# Patient Record
Sex: FEMALE | Race: ASIAN | URBAN - METROPOLITAN AREA
[De-identification: names, ages, dates, MRNs, and addresses within clinical notes are randomized per-mention and may not be internally consistent; named-entity substitution may affect disease eponyms.]

---

## 2023-12-14 ENCOUNTER — OFFICE VISIT (OUTPATIENT)
Dept: ALLERGY | Facility: CLINIC | Age: 38
End: 2023-12-14
Payer: COMMERCIAL

## 2023-12-14 ENCOUNTER — NURSE ONLY (OUTPATIENT)
Dept: ALLERGY | Facility: CLINIC | Age: 38
End: 2023-12-14

## 2023-12-14 VITALS
HEART RATE: 73 BPM | SYSTOLIC BLOOD PRESSURE: 111 MMHG | DIASTOLIC BLOOD PRESSURE: 77 MMHG | RESPIRATION RATE: 20 BRPM | TEMPERATURE: 98 F | OXYGEN SATURATION: 100 %

## 2023-12-14 DIAGNOSIS — Z23 NEED FOR COVID-19 VACCINE: ICD-10-CM

## 2023-12-14 DIAGNOSIS — J30.89 ENVIRONMENTAL AND SEASONAL ALLERGIES: ICD-10-CM

## 2023-12-14 DIAGNOSIS — Z91.018 FOOD ALLERGY: ICD-10-CM

## 2023-12-14 DIAGNOSIS — Z23 FLU VACCINE NEED: ICD-10-CM

## 2023-12-14 DIAGNOSIS — L20.89 OTHER ATOPIC DERMATITIS: Primary | ICD-10-CM

## 2023-12-14 DIAGNOSIS — R22.0 LIP SWELLING: ICD-10-CM

## 2023-12-14 DIAGNOSIS — K90.49 FOOD INTOLERANCE: Primary | ICD-10-CM

## 2023-12-14 PROCEDURE — 95004 PERQ TESTS W/ALRGNC XTRCS: CPT | Performed by: ALLERGY & IMMUNOLOGY

## 2023-12-14 PROCEDURE — 99204 OFFICE O/P NEW MOD 45 MIN: CPT | Performed by: ALLERGY & IMMUNOLOGY

## 2023-12-14 PROCEDURE — 3074F SYST BP LT 130 MM HG: CPT | Performed by: ALLERGY & IMMUNOLOGY

## 2023-12-14 PROCEDURE — 3078F DIAST BP <80 MM HG: CPT | Performed by: ALLERGY & IMMUNOLOGY

## 2023-12-14 RX ORDER — TACROLIMUS 1 MG/G
OINTMENT TOPICAL
Qty: 60 G | Refills: 0 | Status: SHIPPED | OUTPATIENT
Start: 2023-12-14

## 2023-12-14 NOTE — PATIENT INSTRUCTIONS
#1 atopic dermatitis  Known atopic dermatitis for years creases of elbows. More recent issues with episodes of lip swelling that last approximate 12 hours and then turned into an eczematous dermatitis on the upper lip. Denies lower lip swelling or issues. Has backed off on cosmetics without any improvement    Patient concern for food triggers including fish shellfish tomato  And nuts and atopic dermatitis provided and reviewed  Advised be watchful for contact dermatitis as well  List of high histamine foods to be watchful of  Recommend to avoid any foods that are positive on skin testing from an IgE mediated perspective  Trial of Protopic 0.1% ointment twice a day to involve areas. Reviewed Protopic is steroid free can suppress allergic mediators and lead to redness and information  Himanshu Limestone  is a soap, Cetaphil as a cleanser  Clear and free detergents    #2 environmental allergies  See above skin testing to screen for environmental triggers  Reviewed Zyrtec 10 mg or Xyzal 5 mg as an antihistamine if having significant runny nose sneezing itchy watery eyes  Consider trial of Flonase or Nasacort 2 sprays per nostril once a day if having prominent nasal congestion or postnasal drip    #3 Food allergies  See above skin testing to select foods based on clinical history as well as common food allergens given history of atopic dermatitis. #4 lip swelling  Check C4 level. History symptoms last less than 12 hours. Better with Benadryl. Allegra 180 Zyrtec 10 mg or Xyzal 5 mg as needed as nonsedating antihistamines    #5 flu vaccine up-to-date    #6 COVID vaccines. Reviewed and recommended booster this fall. Reviewed new booster available this fall.   Check with local pharmacy I do not stock it in my office           Orders This Visit:  Orders Placed This Encounter   Procedures    Complement C4, Serum

## 2023-12-14 NOTE — PROGRESS NOTES
Praveen Cancino is a 43year old female. HPI:     Chief Complaint   Patient presents with    Food Allergy     Consult. Patient suspects potential seafood allergy. Patient concerned with swollen lips and perioral rash. She offers that her eczema has been flaring. Patient is a 39-year-old female who presents for allergy evaluation with a chief complaint of allergies. History of atopic dermatitis. Reason for visit notes concern for potential food allergies contributing to eczema    PCP is outside independent practitioner  No vaccines on record with us    Today patient reports      Allergies:   Duration: a few months now  Timing: intermittent   Symptoms: lip swelling, ad flares , upper lip , itchy skin , crease, of elbow  Lip swelling last 12 hrs, and rash after wards 1 week   Denies resp issues or  recurrent hives   Severity:  moderate   Status:worsening  Triggers: seafood(shrimp) ? Foods? Tomsto  Freq: 1x/week , now 1x/month   Tried: moisturizers , benadryl , eucrisa   Pets: denies   Nonsmoker   No recent h1   No new cometics  Cetafil, cerave     Hx of asthma, ar, or food allergy: denies      Covid vaccine x 3-4. Jan 2023  Flu vaccine : utd     Patient is a dentist      HISTORY:  History reviewed. No pertinent past medical history. History reviewed. No pertinent surgical history. Family History   Problem Relation Age of Onset    Hypertension Father     Diabetes Father       Social History:   Social History     Socioeconomic History    Marital status:    Tobacco Use    Smoking status: Never     Passive exposure: Never    Smokeless tobacco: Never   Substance and Sexual Activity    Alcohol use: Yes     Comment: occasionally per patient    Drug use: Never        Medications (Active prior to today's visit):  Current Outpatient Medications   Medication Sig Dispense Refill    tacrolimus 0.1 % External Ointment Apply twice a a day as needed to involved areas 60 g 0       Allergies:   Allergies   Allergen Reactions    Insulin Detemir HIVES         ROS:     Allergic/Immuno:  See HPI  Cardiovascular:  Negative for irregular heartbeat/palpitations, chest pain, edema  Constitutional:  Negative night sweats,weight loss, irritability and lethargy  Endocrine:  Negative for cold intolerance, polydipsia and polyphagia  ENMT:  Negative for ear drainage, hearing loss and nasal drainage  Eyes:  Negative for eye discharge and vision loss  Gastrointestinal:  Negative for abdominal pain, diarrhea and vomiting  Genitourinary:  Negative for dysuria and hematuria  Hema/Lymph:  Negative for easy bleeding and easy bruising  Integumentary:   see hpi   Musculoskeletal:  Negative for joint symptoms  Neurological:  Negative for dizziness, seizures  Psychiatric:  Negative for inappropriate interaction and psychiatric symptoms  Respiratory:  Negative for cough, dyspnea and wheezing      PHYSICAL EXAM:   Constitutional: responsive, no acute distress noted  Head/Face: NC/Atraumatic  Eyes/Vision: conjunctiva and lids are normal extraocular motion is intact   Ears/Audiometry: tympanic membranes are normal bilaterally hearing is grossly intact  Nose/Mouth/Throat: nose and throat are clear mucous membranes are moist   Neck/Thyroid: neck is supple without adenopathy  Lymphatic: no abnormal cervical, supraclavicular or axillary adenopathy is noted  Respiratory: normal to inspection lungs are clear to auscultation bilaterally normal respiratory effort   Cardiovascular: regular rate and rhythm no murmurs, gallups, or rubs  Abdomen: soft non-tender non-distended  Skin/Hair: no unusual rashes present  Extremities: no edema, cyanosis, or clubbing  Neurological:Oriented to time, place, person & situation       ASSESSMENT/PLAN:   Assessment   Encounter Diagnoses   Name Primary?     Other atopic dermatitis Yes    Food allergy     Flu vaccine need     Need for COVID-19 vaccine     Lip swelling        Spt today to spt to select foods including fish panel, shellfish panel, milk egg, wheat soy almond walnut peanut was  negative     Skin testing today to common indoor and outdoor environmental allergens was + to cat, mold     + hist control     #1 atopic dermatitis  Known atopic dermatitis for years creases of elbows. More recent issues with episodes of lip swelling that last approximate 12 hours and then turned into an eczematous dermatitis on the upper lip. Denies lower lip swelling or issues. Has backed off on cosmetics without any improvement    Patient concern for food triggers including fish shellfish tomato  And nuts and atopic dermatitis provided and reviewed  Advised be watchful for contact dermatitis as well  List of high histamine foods to be watchful of  Recommend to avoid any foods that are positive on skin testing from an IgE mediated perspective  Trial of Protopic 0.1% ointment twice a day to involve areas. Reviewed Protopic is steroid free can suppress allergic mediators and lead to redness and information  Keiko Valatie  is a soap, Cetaphil as a cleanser  Clear and free detergents    #2 environmental allergies  See above skin testing to screen for environmental triggers  Reviewed Zyrtec 10 mg or Xyzal 5 mg as an antihistamine if having significant runny nose sneezing itchy watery eyes  Consider trial of Flonase or Nasacort 2 sprays per nostril once a day if having prominent nasal congestion or postnasal drip    #3 Food allergies  See above skin testing to select foods based on clinical history as well as common food allergens given history of atopic dermatitis. #4 lip swelling  Check C4 level. History symptoms last less than 12 hours. Better with Benadryl. Allegra 180 Zyrtec 10 mg or Xyzal 5 mg as needed as nonsedating antihistamines    #5 flu vaccine up-to-date    #6 COVID vaccines. Reviewed and recommended booster this fall. Reviewed new booster available this fall.   Check with local pharmacy I do not stock it in my office           Orders This Visit:  Orders Placed This Encounter   Procedures    Complement C4, Serum       Meds This Visit:  Requested Prescriptions     Signed Prescriptions Disp Refills    tacrolimus 0.1 % External Ointment 60 g 0     Sig: Apply twice a a day as needed to involved areas       Imaging & Referrals:  None     12/14/2023  Yady Esteban MD      If medication samples were provided today, they were provided solely for patient education and training related to self administration of these medications. Teaching, instruction and sample was provided to the patient by myself. Teaching included  a review of potential adverse side effects as well as potential efficacy. Patient's questions were answered in regards to medication administration and dosing and potential side effects.  Teaching was provided via the teach back method

## 2024-04-03 NOTE — PATIENT INSTRUCTIONS
- You were seen in clinic for regular annual check-up. We have ordered labs for you and we will call you with the results. Please obtain the bloodwork fasting for 12 hours. OK to drink water the day of your blood draw.      We have the lab downstairs on the first floor.  No appointment is necessary.  Lab hours are Monday-Friday 7:30 AM to 4:45 PM.  There may be Saturday hours 7 AM-11:00 AM as well.     Otherwise you can obtain the blood tests on the weekends at the main hospital or local immediate care/urgent care within the [facility].    - With your history of gestational diabetes, we will need to follow your sugar levels closely over time    -The frequent loose stools may be due to a structural change from prior pregnancies versus functional cause such as irritable bowel syndrome.  We recommended:    Can consider probiotic, Acidophilus once a day.    Amenable to trial of Bentyl 10 mg up to 4 times a day.  Can start off with once a day dosing, to see if this will slow down motility and allow for regular bowel movements.  If improved, does not need to use on a daily basis but can proceed with it on an as-needed basis    If needed, may need referral to gastroenterology for possible colonoscopy and further evaluation.    Send us a [facility] message in 2 weeks on how symptoms are going    -Lets see if we can manage the anxiety further with therapy: Advised to wait for phone call in the next 24-48 hours for patient navigator to provide a list of in network psychologist/therapist  Medication therapy    - Vaccines you may be due for Tdap/tetanus shot, COVID Dose #5  - Please continue to eat a varied diet including recommended servings of vegetables, fruits, and low fat dairy. Minimize high saturated fats (such as fast foods) and high sugar intake (such as soda)  - We recommend 150 minutes of moderate intensity exercise (brisk walking, swimming) weekly to maintain your current weight.  Targeted weight loss will  require more vigorous exercise or more than 150 minutes/week.    Return to clinic in 1 year for annual physical examination

## 2024-04-03 NOTE — H&P
Mine Espino is a 38 year old female.    HPI:     Chief Complaint   Patient presents with    Establish Care    Physical       Ms. ESPINO is a 38 year old female PMHX as below coming in for annual physical examination    Overall doing well. Here for physical and labs.     6 hours on a good night for sleep. Trouble falling asleep in the last year. Maybe some more anxiety. Not panic attacks, moreso mind racing. Moreso mundane and home things like change in routine. Trip planning for example.     Stress relief techniques can help. Breathing exercises, redirection. May benefit from talk therapy    Since her second pregnancy, she has had once a day loose stools, sometimes urgency.  No family history of gastrointestinal conditions.    PMHx  PCOS  Was diagnosed 2015, and did IVF. Otherwise regular menses, sometimes heavy.     History of gestational diabetes  Both pregnancies, did need to take insulin. There is family history of   Last A1c was 5.7%    Eczema  Limited to the arms - heat/wool/acryllic materials. 6 months ago in the upper lip which is newer under the nose x 2 flare-up. Worse with seafood - shrimp  Did allergy testing with Dr. Sahu  She is managing it with cetaphil    Frequent loose stools  May have had coccygeal injury with her first pregnancy which required forceps during labor.  Her second pregnancy, both loose stools would occur once a day where she would have normal loose stools in the past.  Has not tried medications for this.    I reviewed and updated the PMHx, FamHx, medications, allergies, and SocHx as below with the patient    OB/GYN -follows with OBGYN  Last menstrual period: 3/28/2024 - regular. Some heavy, pain.    SocHX  - Home: feels safe at home;  and 2 kids 3 y.o. son, and 6 y.o. daughter. Everyone doing well.  - Work: dentist   - Hobbies: family time, reading.   - Nutrition: vegetarian - some chicken, carbs - dairy, tofu. Chicken 1x a week. Fijian - lentils. Not a lot of rice.   -  Physical Activity: not a lot - periodically with walks, outdoor activities when weather is nice. Stretching for upper body      HISTORY:  Past Medical History:   Diagnosis Date    Anemia     [place] product of IVF pregnancy (HCC) 2017      Past Surgical History:   Procedure Laterality Date    LASIK Bilateral 2009      Family History   Problem Relation Age of Onset    Hypertension Father     Diabetes Father     Osteoporosis Mother     Endometriosis Mother     Diabetes Paternal Grandmother       Social History:   Social History     Socioeconomic History    Marital status:    Tobacco Use    Smoking status: Never     Passive exposure: Never    Smokeless tobacco: Never   Vaping Use    Vaping Use: Never used   Substance and Sexual Activity    Alcohol use: Yes     Comment: occasionally per patient    Drug use: Never        Medications (Active prior to today's visit):  Current Outpatient Medications   Medication Sig Dispense Refill    dicyclomine 10 MG Oral Cap Take 1 capsule (10 mg total) by mouth 4 (four) times daily for 20 days. 40 capsule 1    tacrolimus 0.1 % External Ointment Apply twice a a day as needed to involved areas 60 g 0       Allergies:  Allergies   Allergen Reactions    Insulin Detemir HIVES         ROS:   Positive Findings indicated in BOLD    Constitutional: Fever, Chills, Weight Gain, Weight Loss, Night Sweats, Fatigue, Malaise  ENT/Mouth:  Hearing Changes, Ear Pain, Nasal Congestion, Sinus Pain, Hoarseness, Sore throat, Rhinorrhea, Swallowing Difficulty  Eyes: Eye Pain, Swelling, Redness, Foreign Body, Discharge, Vision Changes  Cardiovascular: Chest Pain, SOB, PND, Dyspnea on Exertion, Orthopnea, Claudication, Edema, Palpitations  Respiratory: Cough, Sputum, Wheezing, Shortness of breath  Gastrointestinal: Nausea, Vomiting, Diarrhea, Constipation, Pain, Heartburn, Dysphagia, Bloody stools, Tarry stools  Genitourinary: Dysmenorrhea, Dysuria, Urinary Frequency, Hematuria, Urinary Incontinence,  Urgency,  Flank Pain  Musculoskeletal: Arthralgias, Myalgias, Joint Swelling, Joint Stiffness, Back Pain, Neck Pain  Integumentary: Skin Lesions, Pruritis, Hair Changes, Jaundice, Nail changes  Neuro: Weakness, Numbness, Paresthesias, Loss of Consciousness, Syncope, Dizziness, Headache, Falls  Psych: Anxiety, Depression, Insomnia, Suicidal Ideation, Homicidal ideation, Memory Changes  Heme/Lymph: Bruising, Bleeding, Lymphadenopathy  Endocrine: Polyuria, Polydipsia, Temperature Intolerance    PHYSICAL EXAM:   Vital Signs:  Blood pressure 102/76, pulse 84, temperature 98.4 °F (36.9 °C), temperature source Oral, height 5' 2.8\" (1.595 m), weight 134 lb (60.8 kg), last menstrual period 03/28/2024, SpO2 99%.     Constitutional: No acute distress. Alert and oriented x 3.  Eyes: EOMI, PERRLA, clear sclera b/l  HENT: NCAT, Moist mucous membranes, Oropharynx without erythema or exudates  Neck: No JVD, no thyromegaly  Cardiovascular: S1, S2, no S3, no S4, Regular rate and rhythm, No murmurs/gallops/rubs.   Vascular: Equal pulses 2+ carotids no bruits or thrills/radial/DP/PT bilaterally  Respiratory: Clear to auscultation bilaterally.  No wheezes/rales/rhonchi  Gastrointestinal: Soft, nontender, nondistended. Positive bowel sounds x 4. No rebound tenderness. No hepatomegaly, No splenomegaly  Genitourinary: No CVA tenderness bilaterally  Neurologic: No focal neurological deficits, CN II-XII intact, light touch intact, MSK Strength 5/5 and symmetric in all extremities, normal gait, 2+ patellar tendon  Musculoskeletal: Full range of motion of all extremities, no clubbing/swelling/edema  Skin: No lesions, No erythema, no jaundice, Cap Refill < 2s  Psychiatric: Appropriate mood and affect  Heme/Lymph/Immune: No cervical LAD      DATA REVIEWED   Labs:  No results found for this or any previous visit (from the past 8760 hour(s)).    No results found for this or any previous visit (from the past 8760 hour(s)).    No results found for:  \"CHOLEST\", \"HDL\", \"LDL\", \"TRIGLY\", \"TRIG\"    Last A1c value was  % done  .          Pathology:  Pap smear 6/23/2022  Final Diagnosis    Negative for Intraepithelial Lesion or Malignancy (NIL).         Electronically signed by Diann Steen CT on 6/28/2022 at  9:05 AM   HPV Results    HPV mRNA E6/E7: No HPV mRNA Detected       ASSESSMENT/PLAN:     Frequent loose stools  Since second pregnancy, almost on a daily basis.  Previously normal stools.  Unsure if this is due to a functional condition such as irritable bowel syndrome versus structural changes from multiparity state  - Can consider probiotic, Acidophilus once a day.  She is taking good yogurt and nutritional probiotics however  - Amenable to trial of Bentyl 10 mg up to 4 times a day.  Can start off with once a day dosing, to see if this will slow down motility and allow for regular bowel movements.  If improved, does not need to use on a daily basis but can proceed with it on an as-needed basis  - Further studies may be required such as colonoscopy  - Can also discuss pelvic floor physical therapy with OB/GYN.    PCOS  Prior history, with need for IVF for pregnancy.  - She has regular menses, intermittent dysmenorrhea/menorrhagia  - Will also workup for elevated sugar levels as below    History of gestational diabetes  A1c 5.8% on last check  - Will repeat fasting glucose and A1c    Eczema  - Localized to the arms and able to find usual triggers  - Perioral eczema, evaluated by allergy in the past  - Continue with Cetaphil, avoiding typical triggers  - Should notify us if there is no improvement as we can consider alternative topicals if needed    Anxiety  More so generalized, specifically with changes in routine from day-to-day basis.  - Therapy - referral given for jakub alcala.  Advised to wait for phone call in the next 24-48 hours for patient navigator to provide a list of in network psychologist/therapist  Medication therapy  -Can consider medications in  the future if needed           Orders This Visit:  Orders Placed This Encounter   Procedures    CBC With Differential With Platelet    Comp Metabolic Panel (14)    Hemoglobin A1C    Lipid Panel    TSH W Reflex To Free T4       Meds This Visit:  Requested Prescriptions     Signed Prescriptions Disp Refills    dicyclomine 10 MG Oral Cap 40 capsule 1     Sig: Take 1 capsule (10 mg total) by mouth 4 (four) times daily for 20 days.       Imaging & Referrals:   NAVIGATOR       Health Maintenance  HTN Screen: At goal  DM Screen: Check A1c/fasting sugar  HLD Screen: Check fasting lipid panel  Osteoporosis Screen (>65 or < 65 with FRAX > 9.3%): Not indicated  HCV Screen: Considered low risk  HIV Screen: considered low risk  G/C/Syphilis: Considered low risk    Colon Cancer Screening (45-70): Not indicated  Breast Cancer Screening (40-70): Not indicated  Cervical Cancer Screening (21-64): Last Pap 2022 - next due 2025 with OBGYN  Lung Cancer Screening (55-79 with 30 p/year and active < 15 years): Not indicated    Influenza: Annually  Td/Tdap: Last Tdap- assess at next visit  Zoster (50+): Not indicated  HPV (19-26): Not indicated  Pneumococcal: Received prevnar 23    Immunization History   Administered Date(s) Administered    Covid-19 Vaccine Pfizer 30 mcg/0.3 ml 12/24/2020, 01/12/2021, 10/06/2021    Covid-19 Vaccine Pfizer Bivalent 30mcg/0.3mL 01/24/2023    FLUZONE 6 months and older PFS 0.5 ml (04745) 10/05/2019, 11/10/2021, 10/10/2023    Influenza 12/07/2015, 12/07/2015, 10/05/2019    Pneumovax 23 08/02/2017    RHO(D) Immune Globulin 10/05/2020, 10/05/2020         Return to clinic in 1 year for annual physical examination    Montrell John MD, 04/04/24, 11:48 AM

## 2024-04-04 ENCOUNTER — OFFICE VISIT (OUTPATIENT)
Dept: INTERNAL MEDICINE CLINIC | Facility: CLINIC | Age: 39
End: 2024-04-04

## 2024-04-04 ENCOUNTER — LAB ENCOUNTER (OUTPATIENT)
Dept: LAB | Age: 39
End: 2024-04-04
Attending: INTERNAL MEDICINE
Payer: COMMERCIAL

## 2024-04-04 VITALS
WEIGHT: 134 LBS | HEIGHT: 62.8 IN | BODY MASS INDEX: 23.74 KG/M2 | SYSTOLIC BLOOD PRESSURE: 102 MMHG | TEMPERATURE: 98 F | DIASTOLIC BLOOD PRESSURE: 76 MMHG | OXYGEN SATURATION: 99 % | HEART RATE: 84 BPM

## 2024-04-04 DIAGNOSIS — F41.9 ANXIETY: ICD-10-CM

## 2024-04-04 DIAGNOSIS — Z13.29 SCREENING FOR THYROID DISORDER: ICD-10-CM

## 2024-04-04 DIAGNOSIS — Z13.220 SCREENING FOR LIPOID DISORDERS: ICD-10-CM

## 2024-04-04 DIAGNOSIS — Z00.00 ANNUAL PHYSICAL EXAM: ICD-10-CM

## 2024-04-04 DIAGNOSIS — O24.419 GESTATIONAL DIABETES MELLITUS (GDM), ANTEPARTUM, GESTATIONAL DIABETES METHOD OF CONTROL UNSPECIFIED (HCC): ICD-10-CM

## 2024-04-04 DIAGNOSIS — E55.9 VITAMIN D DEFICIENCY: ICD-10-CM

## 2024-04-04 DIAGNOSIS — D50.9 IRON DEFICIENCY ANEMIA, UNSPECIFIED IRON DEFICIENCY ANEMIA TYPE: ICD-10-CM

## 2024-04-04 DIAGNOSIS — E28.2 PCOS (POLYCYSTIC OVARIAN SYNDROME): ICD-10-CM

## 2024-04-04 DIAGNOSIS — R22.0 LIP SWELLING: ICD-10-CM

## 2024-04-04 DIAGNOSIS — Z13.0 SCREENING FOR DEFICIENCY ANEMIA: ICD-10-CM

## 2024-04-04 DIAGNOSIS — Z13.1 SCREENING FOR DIABETES MELLITUS: ICD-10-CM

## 2024-04-04 DIAGNOSIS — Z00.00 ANNUAL PHYSICAL EXAM: Primary | ICD-10-CM

## 2024-04-04 LAB
ALBUMIN SERPL-MCNC: 4.5 G/DL (ref 3.2–4.8)
ALBUMIN/GLOB SERPL: 1.4 {RATIO} (ref 1–2)
ALP LIVER SERPL-CCNC: 114 U/L
ALT SERPL-CCNC: 73 U/L
ANION GAP SERPL CALC-SCNC: 0 MMOL/L (ref 0–18)
AST SERPL-CCNC: 45 U/L (ref ?–34)
BASOPHILS # BLD AUTO: 0.04 X10(3) UL (ref 0–0.2)
BASOPHILS NFR BLD AUTO: 0.6 %
BILIRUB SERPL-MCNC: 0.5 MG/DL (ref 0.3–1.2)
BUN BLD-MCNC: 10 MG/DL (ref 9–23)
BUN/CREAT SERPL: 18.5 (ref 10–20)
C4 SERPL-MCNC: 41.9 MG/DL (ref 12–36)
CALCIUM BLD-MCNC: 9.6 MG/DL (ref 8.7–10.4)
CHLORIDE SERPL-SCNC: 110 MMOL/L (ref 98–112)
CHOLEST SERPL-MCNC: 186 MG/DL (ref ?–200)
CO2 SERPL-SCNC: 26 MMOL/L (ref 21–32)
CREAT BLD-MCNC: 0.54 MG/DL
DEPRECATED RDW RBC AUTO: 41.5 FL (ref 35.1–46.3)
EGFRCR SERPLBLD CKD-EPI 2021: 120 ML/MIN/1.73M2 (ref 60–?)
EOSINOPHIL # BLD AUTO: 0.04 X10(3) UL (ref 0–0.7)
EOSINOPHIL NFR BLD AUTO: 0.6 %
ERYTHROCYTE [DISTWIDTH] IN BLOOD BY AUTOMATED COUNT: 16.7 % (ref 11–15)
EST. AVERAGE GLUCOSE BLD GHB EST-MCNC: 108 MG/DL (ref 68–126)
FASTING PATIENT LIPID ANSWER: YES
FASTING STATUS PATIENT QL REPORTED: YES
GLOBULIN PLAS-MCNC: 3.3 G/DL (ref 2.8–4.4)
GLUCOSE BLD-MCNC: 82 MG/DL (ref 70–99)
HBA1C MFR BLD: 5.4 % (ref ?–5.7)
HCT VFR BLD AUTO: 31.5 %
HDLC SERPL-MCNC: 66 MG/DL (ref 40–59)
HGB BLD-MCNC: 8.8 G/DL
IMM GRANULOCYTES # BLD AUTO: 0.01 X10(3) UL (ref 0–1)
IMM GRANULOCYTES NFR BLD: 0.2 %
LDLC SERPL CALC-MCNC: 111 MG/DL (ref ?–100)
LYMPHOCYTES # BLD AUTO: 3.08 X10(3) UL (ref 1–4)
LYMPHOCYTES NFR BLD AUTO: 48.3 %
MCH RBC QN AUTO: 19.5 PG (ref 26–34)
MCHC RBC AUTO-ENTMCNC: 27.9 G/DL (ref 31–37)
MCV RBC AUTO: 69.7 FL
MONOCYTES # BLD AUTO: 0.25 X10(3) UL (ref 0.1–1)
MONOCYTES NFR BLD AUTO: 3.9 %
NEUTROPHILS # BLD AUTO: 2.96 X10 (3) UL (ref 1.5–7.7)
NEUTROPHILS # BLD AUTO: 2.96 X10(3) UL (ref 1.5–7.7)
NEUTROPHILS NFR BLD AUTO: 46.4 %
NONHDLC SERPL-MCNC: 120 MG/DL (ref ?–130)
OSMOLALITY SERPL CALC.SUM OF ELEC: 280 MOSM/KG (ref 275–295)
PLATELET # BLD AUTO: 345 10(3)UL (ref 150–450)
PLATELETS.RETICULATED NFR BLD AUTO: 9 % (ref 0–7)
POTASSIUM SERPL-SCNC: 4 MMOL/L (ref 3.5–5.1)
PROT SERPL-MCNC: 7.8 G/DL (ref 5.7–8.2)
RBC # BLD AUTO: 4.52 X10(6)UL
SODIUM SERPL-SCNC: 136 MMOL/L (ref 136–145)
TRIGL SERPL-MCNC: 48 MG/DL (ref 30–149)
TSI SER-ACNC: 0.9 MIU/ML (ref 0.55–4.78)
VLDLC SERPL CALC-MCNC: 8 MG/DL (ref 0–30)
WBC # BLD AUTO: 6.4 X10(3) UL (ref 4–11)

## 2024-04-04 PROCEDURE — 85060 BLOOD SMEAR INTERPRETATION: CPT

## 2024-04-04 PROCEDURE — 85025 COMPLETE CBC W/AUTO DIFF WBC: CPT

## 2024-04-04 PROCEDURE — 86160 COMPLEMENT ANTIGEN: CPT

## 2024-04-04 PROCEDURE — 84443 ASSAY THYROID STIM HORMONE: CPT

## 2024-04-04 PROCEDURE — 99385 PREV VISIT NEW AGE 18-39: CPT | Performed by: INTERNAL MEDICINE

## 2024-04-04 PROCEDURE — 36415 COLL VENOUS BLD VENIPUNCTURE: CPT

## 2024-04-04 PROCEDURE — 84466 ASSAY OF TRANSFERRIN: CPT

## 2024-04-04 PROCEDURE — 80061 LIPID PANEL: CPT

## 2024-04-04 PROCEDURE — 82728 ASSAY OF FERRITIN: CPT

## 2024-04-04 PROCEDURE — 83540 ASSAY OF IRON: CPT

## 2024-04-04 PROCEDURE — 83036 HEMOGLOBIN GLYCOSYLATED A1C: CPT

## 2024-04-04 PROCEDURE — 82306 VITAMIN D 25 HYDROXY: CPT

## 2024-04-04 PROCEDURE — 80053 COMPREHEN METABOLIC PANEL: CPT

## 2024-04-04 RX ORDER — DICYCLOMINE HYDROCHLORIDE 10 MG/1
10 CAPSULE ORAL 4 TIMES DAILY
Qty: 40 CAPSULE | Refills: 1 | Status: SHIPPED | OUTPATIENT
Start: 2024-04-04 | End: 2024-04-24

## 2024-04-05 ENCOUNTER — TELEPHONE (OUTPATIENT)
Dept: INTERNAL MEDICINE CLINIC | Facility: CLINIC | Age: 39
End: 2024-04-05

## 2024-04-05 DIAGNOSIS — D50.9 IRON DEFICIENCY ANEMIA, UNSPECIFIED IRON DEFICIENCY ANEMIA TYPE: Primary | ICD-10-CM

## 2024-04-05 LAB
DEPRECATED HBV CORE AB SER IA-ACNC: 3.4 NG/ML
IRON SATN MFR SERPL: 3 %
IRON SERPL-MCNC: 17 UG/DL
TIBC SERPL-MCNC: 530 UG/DL (ref 250–425)
TRANSFERRIN SERPL-MCNC: 356 MG/DL (ref 250–380)

## 2024-04-08 ENCOUNTER — TELEPHONE (OUTPATIENT)
Dept: INTERNAL MEDICINE CLINIC | Facility: CLINIC | Age: 39
End: 2024-04-08

## 2024-04-08 ENCOUNTER — TELEPHONE (OUTPATIENT)
Dept: ALLERGY | Facility: CLINIC | Age: 39
End: 2024-04-08

## 2024-04-08 DIAGNOSIS — R79.89 LIVER FUNCTION TEST ABNORMALITY: ICD-10-CM

## 2024-04-08 DIAGNOSIS — D50.9 IRON DEFICIENCY ANEMIA, UNSPECIFIED IRON DEFICIENCY ANEMIA TYPE: ICD-10-CM

## 2024-04-08 DIAGNOSIS — E55.9 VITAMIN D DEFICIENCY: ICD-10-CM

## 2024-04-08 DIAGNOSIS — E61.1 IRON DEFICIENCY: Primary | ICD-10-CM

## 2024-04-08 LAB — VIT D+METAB SERPL-MCNC: 13.3 NG/ML (ref 30–100)

## 2024-04-08 RX ORDER — FERROUS SULFATE 325(65) MG
325 TABLET ORAL
Qty: 45 TABLET | Refills: 0 | Status: SHIPPED | OUTPATIENT
Start: 2024-04-08 | End: 2024-07-07

## 2024-04-08 NOTE — TELEPHONE ENCOUNTER
RN called pt to go over results listed below.  Pt confirmed her name and .  Pt reports that she has not had any further lip swelling(which was why Dr. Sahu ordered C4 level).   Pt reports that she still has lip dermatitis and feels that it is food related--has not yet tried Protopic ointment--will try.  Will also follow up with dermatologist if symptoms do not improve.        ----- Message from Ray Sahu MD sent at 2024  5:57 PM CDT -----    Please contact patient with mildly elevated C4 level of 41.9.  This should not be of concern from an allergy As I would be more concerned for C4 level was low

## 2024-04-08 NOTE — TELEPHONE ENCOUNTER
I reviewed the blood test from 4/4    A1c and fasting sugar within normal limits.  There is anemia hemoglobin 8.8, with corollary iron deficiency with ferritin 3.4    Borderline elevated cholesterol    LFTs mildly elevated.    Recommendations:  - Follow-up with OB/GYN.  May need to address menorrhagia in setting of PCOS  - Should start ferrous sulfate 325 mg every 48 hours  - Plan to repeat liver tests, blood counts, iron levels in 2 months

## 2024-04-09 ENCOUNTER — TELEPHONE (OUTPATIENT)
Dept: INTERNAL MEDICINE CLINIC | Facility: CLINIC | Age: 39
End: 2024-04-09

## 2024-04-09 RX ORDER — ERGOCALCIFEROL 1.25 MG/1
50000 CAPSULE ORAL WEEKLY
Qty: 8 CAPSULE | Refills: 0 | Status: SHIPPED | OUTPATIENT
Start: 2024-04-09 | End: 2024-05-09

## 2024-04-09 NOTE — TELEPHONE ENCOUNTER
Patient called and relayed Dr John' message. Patient verbalized understanding with no further questions noted.

## 2024-04-09 NOTE — TELEPHONE ENCOUNTER
Please notify the patient that we are able to add on the vitamin D from the last set of blood draws.  It is on the low end at 13.6 with normal being more than 30.  Start vitamin D 50,000 units, once a week for 8 weeks.  This was sent to pharmacy.  We will repeat the vitamin D levels in 2 months along with her other levels.

## 2024-04-10 ENCOUNTER — TELEPHONE (OUTPATIENT)
Age: 39
End: 2024-04-10

## 2024-04-11 ENCOUNTER — TELEPHONE (OUTPATIENT)
Age: 39
End: 2024-04-11

## 2024-06-18 ENCOUNTER — PATIENT MESSAGE (OUTPATIENT)
Dept: INTERNAL MEDICINE CLINIC | Facility: CLINIC | Age: 39
End: 2024-06-18

## 2024-06-18 DIAGNOSIS — D64.9 ANEMIA, UNSPECIFIED TYPE: Primary | ICD-10-CM

## 2024-06-19 ENCOUNTER — LAB ENCOUNTER (OUTPATIENT)
Dept: LAB | Age: 39
End: 2024-06-19
Attending: INTERNAL MEDICINE

## 2024-06-19 DIAGNOSIS — E61.1 IRON DEFICIENCY: ICD-10-CM

## 2024-06-19 DIAGNOSIS — E55.9 VITAMIN D DEFICIENCY: ICD-10-CM

## 2024-06-19 DIAGNOSIS — R79.89 LIVER FUNCTION TEST ABNORMALITY: ICD-10-CM

## 2024-06-19 LAB
ALBUMIN SERPL-MCNC: 4.1 G/DL (ref 3.2–4.8)
ALBUMIN/GLOB SERPL: 1.2 {RATIO} (ref 1–2)
ALP LIVER SERPL-CCNC: 93 U/L
ALT SERPL-CCNC: 24 U/L
ANION GAP SERPL CALC-SCNC: 6 MMOL/L (ref 0–18)
AST SERPL-CCNC: 26 U/L (ref ?–34)
BASOPHILS # BLD AUTO: 0.04 X10(3) UL (ref 0–0.2)
BASOPHILS NFR BLD AUTO: 0.5 %
BILIRUB SERPL-MCNC: 0.4 MG/DL (ref 0.3–1.2)
BUN BLD-MCNC: 8 MG/DL (ref 9–23)
BUN/CREAT SERPL: 14.8 (ref 10–20)
CALCIUM BLD-MCNC: 9 MG/DL (ref 8.7–10.4)
CHLORIDE SERPL-SCNC: 109 MMOL/L (ref 98–112)
CO2 SERPL-SCNC: 25 MMOL/L (ref 21–32)
CREAT BLD-MCNC: 0.54 MG/DL
DEPRECATED HBV CORE AB SER IA-ACNC: 6.3 NG/ML
DEPRECATED RDW RBC AUTO: 58.3 FL (ref 35.1–46.3)
EGFRCR SERPLBLD CKD-EPI 2021: 120 ML/MIN/1.73M2 (ref 60–?)
EOSINOPHIL # BLD AUTO: 0.12 X10(3) UL (ref 0–0.7)
EOSINOPHIL NFR BLD AUTO: 1.5 %
ERYTHROCYTE [DISTWIDTH] IN BLOOD BY AUTOMATED COUNT: 21.3 % (ref 11–15)
FASTING STATUS PATIENT QL REPORTED: YES
GLOBULIN PLAS-MCNC: 3.3 G/DL (ref 2–3.5)
GLUCOSE BLD-MCNC: 96 MG/DL (ref 70–99)
HCT VFR BLD AUTO: 38.5 %
HGB BLD-MCNC: 11.7 G/DL
IMM GRANULOCYTES # BLD AUTO: 0.02 X10(3) UL (ref 0–1)
IMM GRANULOCYTES NFR BLD: 0.2 %
IRON SATN MFR SERPL: 7 %
IRON SERPL-MCNC: 33 UG/DL
LYMPHOCYTES # BLD AUTO: 2.82 X10(3) UL (ref 1–4)
LYMPHOCYTES NFR BLD AUTO: 34.3 %
MCH RBC QN AUTO: 23.3 PG (ref 26–34)
MCHC RBC AUTO-ENTMCNC: 30.4 G/DL (ref 31–37)
MCV RBC AUTO: 76.7 FL
MONOCYTES # BLD AUTO: 0.35 X10(3) UL (ref 0.1–1)
MONOCYTES NFR BLD AUTO: 4.3 %
NEUTROPHILS # BLD AUTO: 4.87 X10 (3) UL (ref 1.5–7.7)
NEUTROPHILS # BLD AUTO: 4.87 X10(3) UL (ref 1.5–7.7)
NEUTROPHILS NFR BLD AUTO: 59.2 %
OSMOLALITY SERPL CALC.SUM OF ELEC: 288 MOSM/KG (ref 275–295)
PLATELET # BLD AUTO: 295 10(3)UL (ref 150–450)
PLATELETS.RETICULATED NFR BLD AUTO: 7.9 % (ref 0–7)
POTASSIUM SERPL-SCNC: 4 MMOL/L (ref 3.5–5.1)
PROT SERPL-MCNC: 7.4 G/DL (ref 5.7–8.2)
RBC # BLD AUTO: 5.02 X10(6)UL
SODIUM SERPL-SCNC: 140 MMOL/L (ref 136–145)
TIBC SERPL-MCNC: 459 UG/DL (ref 250–425)
TRANSFERRIN SERPL-MCNC: 308 MG/DL (ref 250–380)
VIT D+METAB SERPL-MCNC: 27 NG/ML (ref 30–100)
WBC # BLD AUTO: 8.2 X10(3) UL (ref 4–11)

## 2024-06-19 PROCEDURE — 36415 COLL VENOUS BLD VENIPUNCTURE: CPT

## 2024-06-19 PROCEDURE — 80053 COMPREHEN METABOLIC PANEL: CPT

## 2024-06-19 PROCEDURE — 85025 COMPLETE CBC W/AUTO DIFF WBC: CPT

## 2024-06-19 PROCEDURE — 84466 ASSAY OF TRANSFERRIN: CPT

## 2024-06-19 PROCEDURE — 83540 ASSAY OF IRON: CPT

## 2024-06-19 PROCEDURE — 82728 ASSAY OF FERRITIN: CPT

## 2024-06-19 PROCEDURE — 82306 VITAMIN D 25 HYDROXY: CPT

## 2024-06-19 NOTE — TELEPHONE ENCOUNTER
From: Mine Castaneda  To: Montrell John  Sent: 6/18/2024 5:32 PM CDT  Subject: Blood work for hemoglobin    Hello,   I'm wondering when am I supposed to get my iron, hemoglobin checked again now that I've been taking the iron supplements since April. I do feel some improvement in my energy, but not significantly. (My hgb was 8.8 at in April.)  Thank you,  Mine

## 2024-06-23 NOTE — TELEPHONE ENCOUNTER
To the pool for Monday - can we order IV Venofer 200 mg weekly x 3 doses through the infusion center?

## 2024-06-24 ENCOUNTER — MED REC SCAN ONLY (OUTPATIENT)
Dept: INTERNAL MEDICINE CLINIC | Facility: CLINIC | Age: 39
End: 2024-06-24

## 2024-06-24 PROBLEM — D64.9 ANEMIA, UNSPECIFIED: Status: ACTIVE | Noted: 2024-06-24

## 2024-06-24 PROBLEM — D50.9 IRON DEFICIENCY ANEMIA, UNSPECIFIED: Status: ACTIVE | Noted: 2024-06-24

## 2024-06-24 NOTE — TELEPHONE ENCOUNTER
Called [facility] (P: [phone]) to determine if a prior auth is needed for Venofer Infusion.  Spoke with rep Marilu VICENTE @ 9:54am est. No PA needed. No reference #. [employer] will fax over confirmation of call.     To Dr. AYALA--  Venofer infusion order form started. Please review/sign (in your inbox).   Please placed Venofer order in epic  Route back once complete, so that clinical can fax necessary paperwork to the infusion center.

## 2024-06-24 NOTE — TELEPHONE ENCOUNTER
Signed IV Venofer infusion order along with copy of insurance card faxed to [facility] infusion center.  Copy in hold bin.    MyChart sent to pt.

## 2024-06-24 NOTE — TELEPHONE ENCOUNTER
Received Faxed Medication Prior Auth Request Letter from [facility] Coverage Review Department stating No Prior Auth required for  Venofer.     Form placed in Red Folder

## 2024-07-03 ENCOUNTER — TELEPHONE (OUTPATIENT)
Dept: HEMATOLOGY/ONCOLOGY | Facility: HOSPITAL | Age: 39
End: 2024-07-03

## 2024-07-03 NOTE — TELEPHONE ENCOUNTER
I called patient to schedule iron infusion. She answered. I asked was this and okay time to schedule. She said she is currently at work and cannot schedule. I provided her with phone number and she will call us back when available to schedule.

## 2024-07-25 ENCOUNTER — OFFICE VISIT (OUTPATIENT)
Dept: HEMATOLOGY/ONCOLOGY | Facility: HOSPITAL | Age: 39
End: 2024-07-25
Attending: INTERNAL MEDICINE
Payer: COMMERCIAL

## 2024-07-25 VITALS
HEART RATE: 77 BPM | SYSTOLIC BLOOD PRESSURE: 111 MMHG | DIASTOLIC BLOOD PRESSURE: 66 MMHG | BODY MASS INDEX: 24 KG/M2 | WEIGHT: 135.69 LBS | TEMPERATURE: 99 F | OXYGEN SATURATION: 100 % | RESPIRATION RATE: 16 BRPM

## 2024-07-25 DIAGNOSIS — D64.9 ANEMIA, UNSPECIFIED: ICD-10-CM

## 2024-07-25 DIAGNOSIS — D50.9 IRON DEFICIENCY ANEMIA, UNSPECIFIED: Primary | ICD-10-CM

## 2024-07-25 PROCEDURE — 96374 THER/PROPH/DIAG INJ IV PUSH: CPT

## 2024-07-25 NOTE — PROGRESS NOTES
Mine to infusion today for Venofer 200mg dose 1 of 3. Pt denies any issues or concerns. Discussed procedure for iron infusion including medication profile, s/s of reaction, possible side effects, and length of infusion. Pt verbalized understanding.     Ordering Provider: Dora Michael Exp: After 3 doses     Pt tolerated infusion without difficulty or complaint. Reviewed next apt date/time: 8/1 @ 1130      Education Record  Learner:  Patient  Disease / Diagnosis: VIVIANA  Barriers / Limitations:  None  Method:  Reinforcement  General Topics:  Medication, Side effects and symptom management, and Plan of care reviewed  Outcome:  Shows understanding

## 2024-08-01 ENCOUNTER — OFFICE VISIT (OUTPATIENT)
Dept: HEMATOLOGY/ONCOLOGY | Facility: HOSPITAL | Age: 39
End: 2024-08-01
Attending: INTERNAL MEDICINE
Payer: COMMERCIAL

## 2024-08-01 VITALS
TEMPERATURE: 98 F | OXYGEN SATURATION: 100 % | RESPIRATION RATE: 16 BRPM | SYSTOLIC BLOOD PRESSURE: 121 MMHG | DIASTOLIC BLOOD PRESSURE: 70 MMHG | HEART RATE: 70 BPM

## 2024-08-01 DIAGNOSIS — D50.9 IRON DEFICIENCY ANEMIA, UNSPECIFIED IRON DEFICIENCY ANEMIA TYPE: ICD-10-CM

## 2024-08-01 DIAGNOSIS — D64.9 ANEMIA, UNSPECIFIED: Primary | ICD-10-CM

## 2024-08-01 PROCEDURE — 96374 THER/PROPH/DIAG INJ IV PUSH: CPT

## 2024-08-01 NOTE — PROGRESS NOTES
Pt here for Venofer 200mg IV dose 2/3. PIV started to L hand, good blood return noted. Venofer infused over 2 min, patient tolerated. Pt denies any issues or concerns. PIV removed.     Ordering Provider: Dora Michael Exp: after completion of 3 doses     Pt tolerated infusion without difficulty or complaint. Reviewed next apt date/time: 8/15 dose 3/3      Education Record  Learner:  Patient  Disease / Diagnosis: VIVIANA  Barriers / Limitations:  None  Method:  Discussion  General Topics:  Medication and Plan of care reviewed  Outcome:  Shows understanding

## 2024-08-15 ENCOUNTER — OFFICE VISIT (OUTPATIENT)
Dept: HEMATOLOGY/ONCOLOGY | Facility: HOSPITAL | Age: 39
End: 2024-08-15
Attending: INTERNAL MEDICINE
Payer: COMMERCIAL

## 2024-08-15 VITALS
BODY MASS INDEX: 24.84 KG/M2 | DIASTOLIC BLOOD PRESSURE: 65 MMHG | HEIGHT: 62 IN | TEMPERATURE: 99 F | SYSTOLIC BLOOD PRESSURE: 104 MMHG | WEIGHT: 135 LBS | RESPIRATION RATE: 16 BRPM | HEART RATE: 74 BPM | OXYGEN SATURATION: 100 %

## 2024-08-15 DIAGNOSIS — D64.9 ANEMIA, UNSPECIFIED: Primary | ICD-10-CM

## 2024-08-15 DIAGNOSIS — D50.9 IRON DEFICIENCY ANEMIA, UNSPECIFIED IRON DEFICIENCY ANEMIA TYPE: ICD-10-CM

## 2024-08-15 PROCEDURE — 96374 THER/PROPH/DIAG INJ IV PUSH: CPT

## 2024-08-15 NOTE — PROGRESS NOTES
Pt here for Venofer 200mg IV dose 3/3. PIV started to L hand, good blood return noted. Venofer infused over 2 min, patient tolerated. Pt denies any issues or concerns. PIV removed.     Ordering Provider: Dora Michael Exp: after completion of 3 doses     Pt tolerated infusion without difficulty or complaint. Reviewed next apt date/time: Patient reached out to MD via [employer] to schedule Iron f/u.        Education Record  Learner:  Patient  Disease / Diagnosis: VIVIANA  Barriers / Limitations:  None  Method:  Discussion  General Topics:  Medication and Plan of care reviewed  Outcome:  Shows understanding

## 2024-11-13 ENCOUNTER — IMMUNIZATION (OUTPATIENT)
Dept: FAMILY MEDICINE CLINIC | Facility: CLINIC | Age: 39
End: 2024-11-13
Payer: COMMERCIAL

## 2024-11-13 DIAGNOSIS — Z23 NEED FOR INFLUENZA VACCINATION: Primary | ICD-10-CM

## 2024-11-13 DIAGNOSIS — Z12.31 ENCOUNTER FOR SCREENING MAMMOGRAM FOR MALIGNANT NEOPLASM OF BREAST: Primary | ICD-10-CM

## 2024-11-13 PROCEDURE — 90656 IIV3 VACC NO PRSV 0.5 ML IM: CPT | Performed by: NURSE PRACTITIONER

## 2024-11-13 PROCEDURE — 90471 IMMUNIZATION ADMIN: CPT | Performed by: NURSE PRACTITIONER

## 2024-11-15 ENCOUNTER — HOSPITAL ENCOUNTER (OUTPATIENT)
Dept: MAMMOGRAPHY | Age: 39
Discharge: HOME | End: 2024-11-15
Attending: INTERNAL MEDICINE
Payer: COMMERCIAL

## 2024-11-15 DIAGNOSIS — Z12.31 ENCOUNTER FOR SCREENING MAMMOGRAM FOR MALIGNANT NEOPLASM OF BREAST: ICD-10-CM

## 2024-11-15 PROCEDURE — 77067 SCR MAMMO BI INCL CAD: CPT | Performed by: INTERNAL MEDICINE

## 2024-11-15 PROCEDURE — 77063 BREAST TOMOSYNTHESIS BI: CPT | Performed by: INTERNAL MEDICINE

## 2024-11-18 ENCOUNTER — TELEPHONE (OUTPATIENT)
Dept: INTERNAL MEDICINE CLINIC | Facility: CLINIC | Age: 39
End: 2024-11-18

## 2024-11-19 NOTE — TELEPHONE ENCOUNTER
A [employer] message sent, mammogram 11/15 within normal limits.  Breast exam mammogram recommended 1 year.  Has appointment with me 12/3

## 2024-11-30 NOTE — PROGRESS NOTES
Mine Espino is a 39 year old female.    HPI:     Chief Complaint   Patient presents with    Anxiety     Brain fog       Ms. ESPINO is a 39 year old female PMHX PCOS, eczema, who presents today for follow-up of brain fog    She did the transfusions for iron. The hair loss has improved. She did see OBGYN and started on hormonal therapy. SHe is still having very heavy menses, looking to try to suppress the menstrual cycle.    The brain fog improved over the summer. End of Sept-Oct, where she had lots of anxiety and word confusion at times. Anxiety comes and goes and impairs her usual activities. Triggered by a lot of things going on at one time. Last major episode was 1 month ago.     Sleep is hard to fall asleep. Can be 1 am. Some days she can sleep early but can wake up 2-5 am and can only sleep a few more hours. Worse with anxious thoughts. When anxiety is low.     Diarrhea urgency in the morning. 6:30-7 am until mid morning. Maybe a correlation.     HISTORY:  Past Medical History:    Anemia     product of IVF pregnancy (HCC)      Past Surgical History:   Procedure Laterality Date    Lasik Bilateral       Family History   Problem Relation Age of Onset    Hypertension Father     Diabetes Father     Osteoporosis Mother     Endometriosis Mother     Diabetes Paternal Grandmother       Social History:   Social History     Socioeconomic History    Marital status:    Tobacco Use    Smoking status: Never     Passive exposure: Never    Smokeless tobacco: Never   Vaping Use    Vaping status: Never Used   Substance and Sexual Activity    Alcohol use: Yes     Comment: occasionally per patient    Drug use: Never     Social Drivers of Health     Food Insecurity: Low Risk  (10/25/2023)    [facility]    Food Insecurity     Have there been times that your food ran out, and you didn't have money to get more?: No     Are there times that you worry  that this might happen?: No   Transportation Needs: Low Risk  (10/25/2023)    [facility]    Transportation Needs     Do you have trouble getting transportation to medical appointments?: No     How do you normally get to and from your appointments?: Other        Medications (Active prior to today's visit):  Current Outpatient Medications   Medication Sig Dispense Refill    buPROPion  MG Oral Tablet 24 Hr Take 1 tablet (150 mg total) by mouth daily. 90 tablet 3    ALPRAZolam 0.25 MG Oral Tab Take 1 tablet (0.25 mg total) by mouth every 6 (six) hours as needed. 30 tablet 0    tacrolimus 0.1 % External Ointment Apply twice a a day as needed to involved areas 60 g 0       Allergies:  Allergies   Allergen Reactions    Insulin Detemir HIVES         ROS:   Positive Findings indicated in BOLD    Constitutional: Fever, Chills, Weight Gain, Weight Loss, Night Sweats, Fatigue, Malaise  ENT/Mouth:  Hearing Changes, Ear Pain, Nasal Congestion, Sinus Pain, Hoarseness, Sore throat, Rhinorrhea, Swallowing Difficulty  Eyes: Eye Pain, Swelling, Redness, Foreign Body, Discharge, Vision Changes  Cardiovascular: Chest Pain, SOB, PND, Dyspnea on Exertion, Orthopnea, Claudication, Edema, Palpitations  Respiratory: Cough, Sputum, Wheezing, Shortness of breath  Gastrointestinal: Nausea, Vomiting, Diarrhea, Constipation, Pain, Heartburn, Dysphagia, Bloody stools, Tarry stools  Genitourinary: Dysmenorrhea, Dysuria, Urinary Frequency, Hematuria, Urinary Incontinence, Urgency,  Flank Pain  Musculoskeletal: Arthralgias, Myalgias, Joint Swelling, Joint Stiffness, Back Pain, Neck Pain  Integumentary: Skin Lesions, Pruritis, Hair Changes, Jaundice, Nail changes  Neuro: Weakness, Numbness, Paresthesias, Loss of Consciousness, Syncope, Dizziness, Headache, Falls  Psych: Anxiety, Depression, Insomnia, Suicidal Ideation, Homicidal ideation, Memory Changes  Heme/Lymph: Bruising,  Bleeding, Lymphadenopathy  Endocrine: Polyuria, Polydipsia, Temperature Intolerance    PHYSICAL EXAM:   Vital Signs:  Blood pressure 138/72, pulse 74, temperature 98 °F (36.7 °C), height 5' 2\" (1.575 m), weight 140 lb 3.2 oz (63.6 kg), last menstrual period 11/28/2024.     Constitutional: No acute distress. Alert and oriented x 3.  Eyes: EOMI, PERRLA, clear sclera b/l  HENT: NCAT, Moist mucous membranes, Oropharynx without erythema or exudates  Neck: No JVD, no thyromegaly  Cardiovascular: S1, S2, no S3, no S4, Regular rate and rhythm, No murmurs/gallops/rubs.   Vascular: Equal pulses 2+ carotids no bruits or thrills/radial/DP/PT bilaterally  Respiratory: Clear to auscultation bilaterally.  No wheezes/rales/rhonchi  Gastrointestinal: Soft, nontender, nondistended. Positive bowel sounds x 4. No rebound tenderness. No hepatomegaly, No splenomegaly  Genitourinary: No CVA tenderness bilaterally  Neurologic: No focal neurological deficits, CN II-XII intact, light touch intact, MSK Strength 5/5 and symmetric in all extremities, normal gait, 2+ patellar tendon  Musculoskeletal: Full range of motion of all extremities, no clubbing/swelling/edema  Skin: No lesions, No erythema, no jaundice, Cap Refill < 2s  Psychiatric: Appropriate mood and affect  Heme/Lymph/Immune: No cervical LAD      DATA REVIEWED   Labs:  Recent Results (from the past 8760 hours)   CBC W/ DIFFERENTIAL    Collection Time: 06/19/24  8:45 AM   Result Value Ref Range    WBC 8.2 4.0 - 11.0 x10(3) uL    RBC 5.02 3.80 - 5.30 x10(6)uL    HGB 11.7 (L) 12.0 - 16.0 g/dL    HCT 38.5 35.0 - 48.0 %    MCV 76.7 (L) 80.0 - 100.0 fL    MCH 23.3 (L) 26.0 - 34.0 pg    MCHC 30.4 (L) 31.0 - 37.0 g/dL    RDW-SD 58.3 (H) 35.1 - 46.3 fL    RDW 21.3 (H) 11.0 - 15.0 %    .0 150.0 - 450.0 10(3)uL    Immature Platelet Fraction 7.9 (H) 0.0 - 7.0 %    Neutrophil Absolute Prelim 4.87 1.50 - 7.70 x10 (3) uL    Neutrophil Absolute 4.87 1.50 - 7.70 x10(3) uL    Lymphocyte  Absolute 2.82 1.00 - 4.00 x10(3) uL    Monocyte Absolute 0.35 0.10 - 1.00 x10(3) uL    Eosinophil Absolute 0.12 0.00 - 0.70 x10(3) uL    Basophil Absolute 0.04 0.00 - 0.20 x10(3) uL    Immature Granulocyte Absolute 0.02 0.00 - 1.00 x10(3) uL    Neutrophil % 59.2 %    Lymphocyte % 34.3 %    Monocyte % 4.3 %    Eosinophil % 1.5 %    Basophil % 0.5 %    Immature Granulocyte % 0.2 %     *Note: Due to a large number of results and/or encounters for the requested time period, some results have not been displayed. A complete set of results can be found in Results Review.       Recent Results (from the past 8760 hours)   Comp Metabolic Panel (14) [E]    Collection Time: 06/19/24  8:45 AM   Result Value Ref Range    Glucose 96 70 - 99 mg/dL    Sodium 140 136 - 145 mmol/L    Potassium 4.0 3.5 - 5.1 mmol/L    Chloride 109 98 - 112 mmol/L    CO2 25.0 21.0 - 32.0 mmol/L    Anion Gap 6 0 - 18 mmol/L    BUN 8 (L) 9 - 23 mg/dL    Creatinine 0.54 (L) 0.55 - 1.02 mg/dL    BUN/CREA Ratio 14.8 10.0 - 20.0    Calcium, Total 9.0 8.7 - 10.4 mg/dL    Calculated Osmolality 288 275 - 295 mOsm/kg    eGFR-Cr 120 >=60 mL/min/1.73m2    ALT 24 10 - 49 U/L    AST 26 <=34 U/L    Alkaline Phosphatase 93 37 - 98 U/L    Bilirubin, Total 0.4 0.3 - 1.2 mg/dL    Total Protein 7.4 5.7 - 8.2 g/dL    Albumin 4.1 3.2 - 4.8 g/dL    Globulin  3.3 2.0 - 3.5 g/dL    A/G Ratio 1.2 1.0 - 2.0    Patient Fasting for CMP? Yes      *Note: Due to a large number of results and/or encounters for the requested time period, some results have not been displayed. A complete set of results can be found in Results Review.       Cholesterol, Total (mg/dL)   Date Value   04/04/2024 186     HDL Cholesterol (mg/dL)   Date Value   04/04/2024 66 (H)     LDL Cholesterol (mg/dL)   Date Value   04/04/2024 111 (H)     Triglycerides (mg/dL)   Date Value   04/04/2024 48       Last A1c value was 5.4% done 4/4/2024.    Mammogram 11/15/2024    Impression   CONCLUSION: There is no  mammographic evidence of malignancy in either breast. As long as patient's clinical breast exam remains unchanged, annual screening mammogram is recommended.     BI-RADS CATEGORY:    DIAGNOSTIC CATEGORY 1 - NEGATIVE ASSESSMENT.           Pathology:  Pap smear 6/23/2022  Final Diagnosis    Negative for Intraepithelial Lesion or Malignancy (NIL).         Electronically signed by Diann Steen CT on 6/28/2022 at  9:05 AM   HPV Results    HPV mRNA E6/E7: No HPV mRNA Detected       ASSESSMENT/PLAN:     Brain fog  Secondary workup was notable for iron deficiency anemia, vitamin D deficiency for which we pursued supplementation with improvement on interval lab test  - There has been some interval improvement.,  However still some brain fog, may be related to anxiety.    We will do a trial of medication management focusing and on anxiety to see if this assists with the brain fog symptoms.  - Alprazolam 0.25 mg no more than 1-2 times per day for severe anxiety/panic attack like symptoms. Discussed potential side effects including cardiopulmonary depression, physical dependence, possibly addiction. Should not be used with other depressant substances such as alcohol  -Wellbutrin 150 mg extended release once a day.  We will start a low-dose once a day.  May take 3-6 weeks to take full effect.  Monitor for any side effects such as upset stomach, diarrhea, dizziness, difficulties with sleep.  This may occur the first few days while starting the medication.  Should send us a [employer] message in 3-4 weeks for condition update and any potential side effects.    Will use the alprazolam as a bridge therapy until the Wellbutrin takes effect.  May be good to try it on a weekend when we have the higher spikes of anxiety before the workweek.  May also help with sleep.  However should only be used on a as needed basis.    Once anxiety improves, lets see how sleep is going at that time.  Lets continue with:    Getting yourself into a rested  state of mind one-on-one for bedtime  Avoid any food or liquid intake 1 hour before going to bed  Turn off all screens including your cell phone and the TV 30 minutes before bed  Try meditation or mindfulness when read at bedtime    - An alternative would be melatonin 3-5 mg nightly as needed, use this in 15 minutes of trying to fall asleep due to the short onset of action          Frequent loose stools  Since second pregnancy, almost on a daily basis.  Previously normal stools.  Unsure if this is due to a functional condition such as irritable bowel syndrome versus structural changes from multiparity state  - Can consider probiotic, Acidophilus once a day.  She is taking good yogurt and nutritional probiotics however  - Amenable to trial of Bentyl 10 mg up to 4 times a day.  Can start off with once a day dosing, to see if this will slow down motility and allow for regular bowel movements.  If improved, does not need to use on a daily basis but can proceed with it on an as-needed basis  - Further studies may be required such as colonoscopy but can hold off on this for now until trials as above    Iron deficiency anemia  - Suspect due to ongoing menses, responded well to ferrous sulfate supplementation  - Hemoglobin improved 11.7, but remains iron deficient  - We did undergo IV Venofer x 3 doses.  - We also checked a celiac screen given frequent loose stools    PCOS  Prior history, with need for IVF for pregnancy.  - She has regular menses, intermittent dysmenorrhea/menorrhagia  - Will also workup for elevated sugar levels as below    History of gestational diabetes  A1c 5.8% on last check  - Will repeat fasting glucose and A1c    Eczema  - Localized to the arms and able to find usual triggers  - Perioral eczema, evaluated by allergy in the past  - Continue with Cetaphil, avoiding typical triggers  - Should notify us if there is no improvement as we can consider alternative topicals if needed    Vitamin D  deficiency  - Vitamin D has increased but still slightly low at 27.0  - Would benefit from 8 weeks of 50,000 units once a week vitamin D       Orders This Visit:  Orders Placed This Encounter   Procedures    CBC With Differential With Platelet    Basic Metabolic Panel (8)    Ferritin    Hemoglobin A1C    Vitamin D    Vitamin B12    CELIAC DISEASE SCREEN Reflex [E]    Iron And Tibc       Meds This Visit:  Requested Prescriptions     Signed Prescriptions Disp Refills    buPROPion  MG Oral Tablet 24 Hr 90 tablet 3     Sig: Take 1 tablet (150 mg total) by mouth daily.    ALPRAZolam 0.25 MG Oral Tab 30 tablet 0     Sig: Take 1 tablet (0.25 mg total) by mouth every 6 (six) hours as needed.       Imaging & Referrals:  None       Health Maintenance  HTN Screen: At goal  DM Screen: As above  HLD Screen: As above  Osteoporosis Screen (>65 or < 65 with FRAX > 9.3%): Not indicated  HCV Screen: Considered low risk  HIV Screen: considered low risk  G/C/Syphilis: Considered low risk    Colon Cancer Screening (45-70): Not indicated  Breast Cancer Screening (40-70): Not indicated  Cervical Cancer Screening (21-64): Last Pap 2022 - next due 2025 with OBGYN  Lung Cancer Screening (55-79 with 30 p/year and active < 15 years): Not indicated    Influenza: Annually  Td/Tdap: Last Tdap- assess at next visit  Zoster (50+): Not indicated  HPV (19-26): Not indicated  Pneumococcal: Received prevnar 23, up-to-date    Immunization History   Administered Date(s) Administered    Covid-19 Vaccine Pfizer 30 mcg/0.3 ml 12/24/2020, 01/12/2021, 10/06/2021    Covid-19 Vaccine Pfizer Bivalent 30mcg/0.3mL 01/24/2023    FLUZONE 6 months and older PFS 0.5 ml (69459) 10/05/2019, 11/10/2021, 10/10/2023    Influenza 12/07/2015, 12/07/2015, 10/05/2019    Influenza Vaccine, trivalent (IIV3), PF 0.5mL (85625) 11/13/2024    Pneumovax 23 08/02/2017    RHO(D) Immune Globulin 10/05/2020, 10/05/2020    TDAP 07/14/2017, 07/21/2020     Spent 30 minutes obtaining  history, evaluating patient, discussing treatment options, diet, exercise, review of available labs and radiology reports, and completing documentation.       Return to clinic in 6 months for annual physical examination    Montrell John MD, 12/03/24, 2:12 PM

## 2024-11-30 NOTE — PATIENT INSTRUCTIONS
You are seen in clinic today for follow-up.  Today, we did review most recent set of blood work which did show iron deficiency anemia, low vitamin D levels that are improving with supplementation  - Will plan to repeat blood test today to ensure that these numbers improved  - We should also check for celiac screen given the loose stools    We will do a trial of medication management focusing and on anxiety to see if this assists with the brain fog symptoms.  - Alprazolam 0.25 mg no more than 1-2 times per day for severe anxiety/panic attack like symptoms. Discussed potential side effects including cardiopulmonary depression, physical dependence, possibly addiction. Should not be used with other depressant substances such as alcohol  -Wellbutrin 150 mg extended release once a day.  We will start a low-dose once a day.  May take 3-6 weeks to take full effect.  Monitor for any side effects such as upset stomach, diarrhea, dizziness, difficulties with sleep.  This may occur the first few days while starting the medication.  Should send us a [employer] message in 3-4 weeks for condition update and any potential side effects.    Will use the alprazolam as a bridge therapy until the Wellbutrin takes effect.  May be good to try it on a weekend when we have the higher spikes of anxiety before the workweek.  May also help with sleep.  However should only be used on a as needed basis.    Once anxiety improves, lets see how sleep is going at that time.  Lets continue with:    Getting yourself into a rested state of mind one-on-one for bedtime  Avoid any food or liquid intake 1 hour before going to bed  Turn off all screens including your cell phone and the TV 30 minutes before bed  Try meditation or mindfulness when read at bedtime    - An alternative would be melatonin 3-5 mg nightly as needed, use this in 15 minutes of trying to fall asleep due to the short onset of action    Lets see if the frequent loose stools and urgency  improves with management of anxiety, \"nervous stomach \"  If no improvement:  - Let's trial of Bentyl 10 mg up to 4 times a day.  Can start off with once a day dosing, to see if this will slow down motility and allow for regular bowel movements.  If improved, does not need to use on a daily basis but can proceed with it on an as-needed basis    Return to clinic in 6 months for follow-up

## 2024-12-03 ENCOUNTER — OFFICE VISIT (OUTPATIENT)
Dept: INTERNAL MEDICINE CLINIC | Facility: CLINIC | Age: 39
End: 2024-12-03
Payer: COMMERCIAL

## 2024-12-03 ENCOUNTER — LAB ENCOUNTER (OUTPATIENT)
Dept: LAB | Age: 39
End: 2024-12-03
Attending: INTERNAL MEDICINE
Payer: COMMERCIAL

## 2024-12-03 VITALS
BODY MASS INDEX: 25.8 KG/M2 | HEART RATE: 74 BPM | HEIGHT: 62 IN | DIASTOLIC BLOOD PRESSURE: 72 MMHG | TEMPERATURE: 98 F | WEIGHT: 140.19 LBS | SYSTOLIC BLOOD PRESSURE: 138 MMHG

## 2024-12-03 DIAGNOSIS — E55.9 VITAMIN D DEFICIENCY: ICD-10-CM

## 2024-12-03 DIAGNOSIS — R19.7 DIARRHEA, UNSPECIFIED TYPE: ICD-10-CM

## 2024-12-03 DIAGNOSIS — Z00.00 ANNUAL PHYSICAL EXAM: ICD-10-CM

## 2024-12-03 DIAGNOSIS — E53.8 VITAMIN B12 DEFICIENCY: ICD-10-CM

## 2024-12-03 DIAGNOSIS — E61.1 IRON DEFICIENCY: ICD-10-CM

## 2024-12-03 DIAGNOSIS — R79.89 LIVER FUNCTION TEST ABNORMALITY: ICD-10-CM

## 2024-12-03 DIAGNOSIS — E28.2 PCOS (POLYCYSTIC OVARIAN SYNDROME): ICD-10-CM

## 2024-12-03 DIAGNOSIS — O24.419 GESTATIONAL DIABETES MELLITUS (GDM), ANTEPARTUM, GESTATIONAL DIABETES METHOD OF CONTROL UNSPECIFIED (HCC): ICD-10-CM

## 2024-12-03 DIAGNOSIS — Z00.00 ANNUAL PHYSICAL EXAM: Primary | ICD-10-CM

## 2024-12-03 DIAGNOSIS — D64.9 ANEMIA, UNSPECIFIED TYPE: ICD-10-CM

## 2024-12-03 DIAGNOSIS — F41.9 ANXIETY: ICD-10-CM

## 2024-12-03 LAB
ANION GAP SERPL CALC-SCNC: 7 MMOL/L (ref 0–18)
BASOPHILS # BLD AUTO: 0.04 X10(3) UL (ref 0–0.2)
BASOPHILS NFR BLD AUTO: 0.5 %
BUN BLD-MCNC: 9 MG/DL (ref 9–23)
BUN/CREAT SERPL: 15 (ref 10–20)
CALCIUM BLD-MCNC: 10 MG/DL (ref 8.7–10.4)
CHLORIDE SERPL-SCNC: 106 MMOL/L (ref 98–112)
CO2 SERPL-SCNC: 26 MMOL/L (ref 21–32)
CREAT BLD-MCNC: 0.6 MG/DL
DEPRECATED HBV CORE AB SER IA-ACNC: 7 NG/ML
DEPRECATED RDW RBC AUTO: 37.7 FL (ref 35.1–46.3)
EGFRCR SERPLBLD CKD-EPI 2021: 116 ML/MIN/1.73M2 (ref 60–?)
EOSINOPHIL # BLD AUTO: 0.08 X10(3) UL (ref 0–0.7)
EOSINOPHIL NFR BLD AUTO: 1.1 %
ERYTHROCYTE [DISTWIDTH] IN BLOOD BY AUTOMATED COUNT: 12.4 % (ref 11–15)
EST. AVERAGE GLUCOSE BLD GHB EST-MCNC: 111 MG/DL (ref 68–126)
FASTING STATUS PATIENT QL REPORTED: NO
GLUCOSE BLD-MCNC: 73 MG/DL (ref 70–99)
HBA1C MFR BLD: 5.5 % (ref ?–5.7)
HCT VFR BLD AUTO: 40.6 %
HGB BLD-MCNC: 13.5 G/DL
IGA SERPL-MCNC: 444.5 MG/DL (ref 70–312)
IMM GRANULOCYTES # BLD AUTO: 0.01 X10(3) UL (ref 0–1)
IMM GRANULOCYTES NFR BLD: 0.1 %
IRON SATN MFR SERPL: 13 %
IRON SERPL-MCNC: 71 UG/DL
LYMPHOCYTES # BLD AUTO: 3.2 X10(3) UL (ref 1–4)
LYMPHOCYTES NFR BLD AUTO: 43.9 %
MCH RBC QN AUTO: 27.4 PG (ref 26–34)
MCHC RBC AUTO-ENTMCNC: 33.3 G/DL (ref 31–37)
MCV RBC AUTO: 82.5 FL
MONOCYTES # BLD AUTO: 0.29 X10(3) UL (ref 0.1–1)
MONOCYTES NFR BLD AUTO: 4 %
NEUTROPHILS # BLD AUTO: 3.67 X10 (3) UL (ref 1.5–7.7)
NEUTROPHILS # BLD AUTO: 3.67 X10(3) UL (ref 1.5–7.7)
NEUTROPHILS NFR BLD AUTO: 50.4 %
OSMOLALITY SERPL CALC.SUM OF ELEC: 285 MOSM/KG (ref 275–295)
PLATELET # BLD AUTO: 336 10(3)UL (ref 150–450)
POTASSIUM SERPL-SCNC: 3.8 MMOL/L (ref 3.5–5.1)
RBC # BLD AUTO: 4.92 X10(6)UL
SODIUM SERPL-SCNC: 139 MMOL/L (ref 136–145)
TIBC SERPL-MCNC: 545 UG/DL (ref 250–425)
TRANSFERRIN SERPL-MCNC: 366 MG/DL (ref 250–380)
VIT B12 SERPL-MCNC: 610 PG/ML (ref 211–911)
VIT D+METAB SERPL-MCNC: 16.5 NG/ML (ref 30–100)
WBC # BLD AUTO: 7.3 X10(3) UL (ref 4–11)

## 2024-12-03 PROCEDURE — 85025 COMPLETE CBC W/AUTO DIFF WBC: CPT

## 2024-12-03 PROCEDURE — 82607 VITAMIN B-12: CPT

## 2024-12-03 PROCEDURE — 83036 HEMOGLOBIN GLYCOSYLATED A1C: CPT

## 2024-12-03 PROCEDURE — 36415 COLL VENOUS BLD VENIPUNCTURE: CPT

## 2024-12-03 PROCEDURE — 82728 ASSAY OF FERRITIN: CPT

## 2024-12-03 PROCEDURE — 82784 ASSAY IGA/IGD/IGG/IGM EACH: CPT

## 2024-12-03 PROCEDURE — 83540 ASSAY OF IRON: CPT

## 2024-12-03 PROCEDURE — 99214 OFFICE O/P EST MOD 30 MIN: CPT | Performed by: INTERNAL MEDICINE

## 2024-12-03 PROCEDURE — 82306 VITAMIN D 25 HYDROXY: CPT

## 2024-12-03 PROCEDURE — 80048 BASIC METABOLIC PNL TOTAL CA: CPT

## 2024-12-03 PROCEDURE — 84466 ASSAY OF TRANSFERRIN: CPT

## 2024-12-03 PROCEDURE — 86364 TISS TRNSGLTMNASE EA IG CLAS: CPT

## 2024-12-03 RX ORDER — ALPRAZOLAM 0.25 MG/1
0.25 TABLET ORAL EVERY 6 HOURS PRN
Qty: 30 TABLET | Refills: 0 | Status: SHIPPED | OUTPATIENT
Start: 2024-12-03

## 2024-12-03 RX ORDER — BUPROPION HYDROCHLORIDE 150 MG/1
150 TABLET ORAL DAILY
Qty: 90 TABLET | Refills: 3 | Status: SHIPPED | OUTPATIENT
Start: 2024-12-03

## 2024-12-05 LAB — TTG IGA SER-ACNC: 0.7 U/ML (ref ?–7)

## 2024-12-11 ENCOUNTER — TELEPHONE (OUTPATIENT)
Dept: INTERNAL MEDICINE CLINIC | Facility: CLINIC | Age: 39
End: 2024-12-11

## 2024-12-11 DIAGNOSIS — K52.9 CHRONIC DIARRHEA: Primary | ICD-10-CM

## 2024-12-11 DIAGNOSIS — R76.8 HIGH TOTAL SERUM IGA: ICD-10-CM

## 2024-12-11 NOTE — TELEPHONE ENCOUNTER
MyChart message sent    Iron deficiency without anemia  IgA elevated, but tissue transglutaminase within normal limits.  MyChart message

## 2025-06-11 ENCOUNTER — OFFICE VISIT (OUTPATIENT)
Dept: INTERNAL MEDICINE CLINIC | Facility: CLINIC | Age: 40
End: 2025-06-11

## 2025-06-11 ENCOUNTER — LAB ENCOUNTER (OUTPATIENT)
Dept: LAB | Age: 40
End: 2025-06-11
Attending: STUDENT IN AN ORGANIZED HEALTH CARE EDUCATION/TRAINING PROGRAM
Payer: COMMERCIAL

## 2025-06-11 VITALS
HEART RATE: 82 BPM | RESPIRATION RATE: 18 BRPM | BODY MASS INDEX: 26.43 KG/M2 | DIASTOLIC BLOOD PRESSURE: 68 MMHG | OXYGEN SATURATION: 99 % | WEIGHT: 143.63 LBS | HEIGHT: 62 IN | SYSTOLIC BLOOD PRESSURE: 102 MMHG | TEMPERATURE: 97 F

## 2025-06-11 DIAGNOSIS — F41.1 GENERALIZED ANXIETY DISORDER: ICD-10-CM

## 2025-06-11 DIAGNOSIS — Z00.00 HEALTH MAINTENANCE EXAMINATION: ICD-10-CM

## 2025-06-11 DIAGNOSIS — E55.9 VITAMIN D DEFICIENCY: Primary | ICD-10-CM

## 2025-06-11 DIAGNOSIS — E55.9 VITAMIN D DEFICIENCY: ICD-10-CM

## 2025-06-11 DIAGNOSIS — D64.9 ANEMIA, UNSPECIFIED TYPE: ICD-10-CM

## 2025-06-11 DIAGNOSIS — Z12.31 VISIT FOR SCREENING MAMMOGRAM: ICD-10-CM

## 2025-06-11 PROBLEM — E28.2 POLYCYSTIC OVARY SYNDROME: Status: ACTIVE | Noted: 2024-05-13

## 2025-06-11 PROBLEM — O24.410 DIET CONTROLLED GESTATIONAL DIABETES MELLITUS (GDM) IN THIRD TRIMESTER (HCC): Status: ACTIVE | Noted: 2017-07-17

## 2025-06-11 LAB
ALBUMIN SERPL-MCNC: 4.5 G/DL (ref 3.2–4.8)
ALBUMIN/GLOB SERPL: 1.6 {RATIO} (ref 1–2)
ALP LIVER SERPL-CCNC: 92 U/L (ref 37–98)
ALT SERPL-CCNC: 16 U/L (ref 10–49)
ANION GAP SERPL CALC-SCNC: 11 MMOL/L (ref 0–18)
AST SERPL-CCNC: 21 U/L (ref ?–34)
BASOPHILS # BLD AUTO: 0.05 X10(3) UL (ref 0–0.2)
BASOPHILS NFR BLD AUTO: 0.7 %
BILIRUB SERPL-MCNC: 0.5 MG/DL (ref 0.3–1.2)
BUN BLD-MCNC: 8 MG/DL (ref 9–23)
BUN/CREAT SERPL: 12.5 (ref 10–20)
CALCIUM BLD-MCNC: 9 MG/DL (ref 8.7–10.4)
CHLORIDE SERPL-SCNC: 105 MMOL/L (ref 98–112)
CHOLEST SERPL-MCNC: 191 MG/DL (ref ?–200)
CO2 SERPL-SCNC: 22 MMOL/L (ref 21–32)
CREAT BLD-MCNC: 0.64 MG/DL (ref 0.55–1.02)
DEPRECATED HBV CORE AB SER IA-ACNC: 5 NG/ML (ref 50–306)
DEPRECATED RDW RBC AUTO: 41.2 FL (ref 35.1–46.3)
EGFRCR SERPLBLD CKD-EPI 2021: 115 ML/MIN/1.73M2 (ref 60–?)
EOSINOPHIL # BLD AUTO: 0.11 X10(3) UL (ref 0–0.7)
EOSINOPHIL NFR BLD AUTO: 1.5 %
ERYTHROCYTE [DISTWIDTH] IN BLOOD BY AUTOMATED COUNT: 14.4 % (ref 11–15)
EST. AVERAGE GLUCOSE BLD GHB EST-MCNC: 108 MG/DL (ref 68–126)
FASTING PATIENT LIPID ANSWER: YES
FASTING STATUS PATIENT QL REPORTED: YES
GLOBULIN PLAS-MCNC: 2.9 G/DL (ref 2–3.5)
GLUCOSE BLD-MCNC: 84 MG/DL (ref 70–99)
HBA1C MFR BLD: 5.4 % (ref ?–5.7)
HCT VFR BLD AUTO: 36.9 % (ref 35–48)
HDLC SERPL-MCNC: 60 MG/DL (ref 40–59)
HGB BLD-MCNC: 11.3 G/DL (ref 12–16)
IMM GRANULOCYTES # BLD AUTO: 0.02 X10(3) UL (ref 0–1)
IMM GRANULOCYTES NFR BLD: 0.3 %
IRON SATN MFR SERPL: 11 % (ref 15–50)
IRON SERPL-MCNC: 49 UG/DL (ref 50–170)
LDLC SERPL CALC-MCNC: 108 MG/DL (ref ?–100)
LYMPHOCYTES # BLD AUTO: 2.64 X10(3) UL (ref 1–4)
LYMPHOCYTES NFR BLD AUTO: 35 %
MCH RBC QN AUTO: 24.2 PG (ref 26–34)
MCHC RBC AUTO-ENTMCNC: 30.6 G/DL (ref 31–37)
MCV RBC AUTO: 79 FL (ref 80–100)
MONOCYTES # BLD AUTO: 0.33 X10(3) UL (ref 0.1–1)
MONOCYTES NFR BLD AUTO: 4.4 %
NEUTROPHILS # BLD AUTO: 4.4 X10 (3) UL (ref 1.5–7.7)
NEUTROPHILS # BLD AUTO: 4.4 X10(3) UL (ref 1.5–7.7)
NEUTROPHILS NFR BLD AUTO: 58.1 %
NONHDLC SERPL-MCNC: 131 MG/DL (ref ?–130)
OSMOLALITY SERPL CALC.SUM OF ELEC: 284 MOSM/KG (ref 275–295)
PLATELET # BLD AUTO: 374 10(3)UL (ref 150–450)
POTASSIUM SERPL-SCNC: 3.9 MMOL/L (ref 3.5–5.1)
PROT SERPL-MCNC: 7.4 G/DL (ref 5.7–8.2)
RBC # BLD AUTO: 4.67 X10(6)UL (ref 3.8–5.3)
SODIUM SERPL-SCNC: 138 MMOL/L (ref 136–145)
TOTAL IRON BINDING CAPACITY: 437 UG/DL (ref 250–425)
TRANSFERRIN SERPL-MCNC: 344 MG/DL (ref 250–380)
TRIGL SERPL-MCNC: 133 MG/DL (ref 30–149)
TSI SER-ACNC: 0.9 UIU/ML (ref 0.55–4.78)
VIT B12 SERPL-MCNC: 562 PG/ML (ref 211–911)
VIT D+METAB SERPL-MCNC: 25.1 NG/ML (ref 30–100)
VLDLC SERPL CALC-MCNC: 23 MG/DL (ref 0–30)
WBC # BLD AUTO: 7.6 X10(3) UL (ref 4–11)

## 2025-06-11 PROCEDURE — 83540 ASSAY OF IRON: CPT | Performed by: STUDENT IN AN ORGANIZED HEALTH CARE EDUCATION/TRAINING PROGRAM

## 2025-06-11 PROCEDURE — 83036 HEMOGLOBIN GLYCOSYLATED A1C: CPT | Performed by: STUDENT IN AN ORGANIZED HEALTH CARE EDUCATION/TRAINING PROGRAM

## 2025-06-11 PROCEDURE — 82306 VITAMIN D 25 HYDROXY: CPT

## 2025-06-11 PROCEDURE — 80053 COMPREHEN METABOLIC PANEL: CPT

## 2025-06-11 PROCEDURE — 84443 ASSAY THYROID STIM HORMONE: CPT

## 2025-06-11 PROCEDURE — 82607 VITAMIN B-12: CPT

## 2025-06-11 PROCEDURE — 36415 COLL VENOUS BLD VENIPUNCTURE: CPT

## 2025-06-11 PROCEDURE — 80061 LIPID PANEL: CPT

## 2025-06-11 PROCEDURE — 84466 ASSAY OF TRANSFERRIN: CPT | Performed by: STUDENT IN AN ORGANIZED HEALTH CARE EDUCATION/TRAINING PROGRAM

## 2025-06-11 PROCEDURE — 82728 ASSAY OF FERRITIN: CPT | Performed by: STUDENT IN AN ORGANIZED HEALTH CARE EDUCATION/TRAINING PROGRAM

## 2025-06-11 PROCEDURE — 85025 COMPLETE CBC W/AUTO DIFF WBC: CPT

## 2025-06-11 NOTE — PROGRESS NOTES
Subjective     Chief Complaint   Patient presents with    Physical     New pt.        History of Present Illness  Mine Castaneda is a 39 year old female who presents for an annual exam.     She is seeking to revisit the dosage of her bupropion, which she takes 150 mg once daily for anxiety. Initially, the medication was effective, but over the last month to six weeks, her anxiety symptoms have returned, though not to the previous level. She uses alprazolam as needed, approximately twice a month, primarily to aid sleep when she feels anxious about the upcoming work week.    She requests updated lab work to monitor her anemia, identified last year. Previous workup showed low iron and ferritin levels, with a ferritin level of 7 in December and a prior value of 6.3. She was on oral iron supplements and received three iron transfusions in August. She experiences constipation as a side effect of iron supplementation but no stomach pain.    She mentions a previous celiac screening test that showed a high IgA level, but she did not follow up on this. She has experienced loose stools since 2020, with frequent morning urgency, but no blood in stool or urine. She has eliminated lactose from her diet without improvement in symptoms.    Her last Pap smear was in July 2024, and she had a mammogram in November 2024. She has not been taking vitamin D supplements recently, although her vitamin D was low in the past. Her hemoglobin A1c was normal in December 2024, but she requests it to be checked again due to concern about diabetes. She follows a mostly vegetarian diet, occasionally consuming fish and chicken. No smoking, occasional alcohol use, and denies any drug use.    Results      Past Medical History[1]    Past Surgical History[2]    Allergies[3]    Family History[4]    Social Hx on file[5]      I have personally reviewed and updated the following EMR sections as appropriate: Current medications, Allergies, Problem list, Past  Medical History, Past Surgical History, Social History and Family History    Review of Systems   Constitutional:  Negative for chills and fever.   Respiratory:  Negative for cough and shortness of breath.    Cardiovascular:  Negative for chest pain.   Gastrointestinal:  Negative for abdominal pain and diarrhea.   Endocrine: Negative for polydipsia and polyuria.   Musculoskeletal:  Negative for joint swelling.   Skin:  Negative for rash and wound.   Neurological:  Negative for dizziness and numbness.       Objective     Medications Ordered Prior to Encounter[6]  /68 (BP Location: Right arm, Patient Position: Sitting, Cuff Size: adult)   Pulse 82   Temp 97.1 °F (36.2 °C) (Temporal)   Resp 18   Ht 5' 2\" (1.575 m)   Wt 143 lb 9.6 oz (65.1 kg)   LMP 05/17/2025 (Approximate)   SpO2 99%   BMI 26.26 kg/m²   Physical Exam  Vitals reviewed.   Constitutional:       Appearance: Normal appearance.   HENT:      Head: Normocephalic and atraumatic.   Cardiovascular:      Rate and Rhythm: Normal rate and regular rhythm.   Pulmonary:      Effort: Pulmonary effort is normal.      Breath sounds: Normal breath sounds.   Abdominal:      General: Abdomen is flat. There is no distension.      Palpations: Abdomen is soft.      Tenderness: There is no abdominal tenderness.   Musculoskeletal:      Cervical back: Normal range of motion and neck supple.   Lymphadenopathy:      Cervical: No cervical adenopathy.   Skin:     General: Skin is warm and dry.   Neurological:      General: No focal deficit present.      Mental Status: She is alert and oriented to person, place, and time.   Psychiatric:         Mood and Affect: Mood normal.         Behavior: Behavior normal.         No results found for this or any previous visit (from the past 14 weeks).    Assessment and Plan      Vitamin D deficiency (Primary)  -     Vitamin D; Future; Expected date: 06/11/2025  Health maintenance examination  -     Hemoglobin A1C  -     CBC With  Differential With Platelet; Future; Expected date: 06/11/2025  -     Comp Metabolic Panel (14); Future; Expected date: 06/11/2025  -     Lipid Panel; Future; Expected date: 06/11/2025  -     Ferritin  -     Iron And Tibc  -     TSH W Reflex To Free T4; Future; Expected date: 06/11/2025  Visit for screening mammogram  -     [facility] 2D+3D SCREENING BILAT (CPT=77067/80606); Future; Expected date: 06/11/2025  Generalized anxiety disorder  -     Behavioral Health Consultation      Assessment & Plan  Anxiety  Increased anxiety despite bupropion. Alprazolam used twice monthly for sleep. Discussed combining medication with behavioral therapy. Recommended magnesium glycinate for sleep.  - Increase bupropion to 225 mg daily. Consider additional 75 mg tablet if needed.  - Arrange therapy sessions.  - Discuss magnesium glycinate for sleep.    Iron Deficiency Anemia  Low iron and ferritin levels. Previous oral supplementation and transfusions. Advised vitamin C with iron for absorption.  - Order CBC, CMP, and iron studies.  - Prescribe oral iron if levels are low.  - Advise vitamin C with oral iron for absorption.    Vitamin D Deficiency  Low vitamin D levels. Not taking supplements for two months.  - Order vitamin D level test.  - Consider high-dose weekly supplementation if low.    Celiac Disease Screening  Chronic loose stools since 2020. Discussed IBS as possible diagnosis. Recommended food journal.  - Investigate previous celiac screening results.  - Consider gastroenterologist referral if needed.  - Recommend food journal for dietary triggers.    General Health Maintenance  Vaccinations up to date. Mammogram and Pap smear done in 2024.  - Order mammogram for Nov/Dec 2025.  - Request Pap smear records from July 2024.  - Advise dietary modifications: reduce carbs, avoid sugary drinks, increase fruits, vegetables, nuts, seeds, limit red meat.  - Recommend 150 minutes moderate exercise weekly, strength training twice  weekly.       No follow-ups on file.    Sachin Barrientos MD  Internal Medicine  2025         [1]   Past Medical History:   Anemia    Racine product of IVF pregnancy (HCC)   [2]   Past Surgical History:  Procedure Laterality Date    Lasik Bilateral    [3]   Allergies  Allergen Reactions    Insulin Detemir HIVES   [4]   Family History  Problem Relation Age of Onset    Hypertension Father     Diabetes Father     Osteoporosis Mother     Endometriosis Mother     Diabetes Paternal Grandmother    [5]   Social History  Socioeconomic History    Marital status:    Tobacco Use    Smoking status: Never     Passive exposure: Never    Smokeless tobacco: Never   Vaping Use    Vaping status: Never Used   Substance and Sexual Activity    Alcohol use: Yes     Comment: occasionally per patient    Drug use: Never   [6]   Current Outpatient Medications on File Prior to Visit   Medication Sig Dispense Refill    buPROPion  MG Oral Tablet 24 Hr Take 1 tablet (150 mg total) by mouth daily. 90 tablet 3    ALPRAZolam 0.25 MG Oral Tab Take 1 tablet (0.25 mg total) by mouth every 6 (six) hours as needed. 30 tablet 0    tacrolimus 0.1 % External Ointment Apply twice a a day as needed to involved areas 60 g 0     No current facility-administered medications on file prior to visit.

## 2025-06-11 NOTE — PROGRESS NOTES
The following individual(s) verbally consented to be recorded using ambient AI listening technology and understand that they can each withdraw their consent to this listening technology at any point by asking the clinician to turn off or pause the recording:YES    Patient name: Mine Castaneda  Additional names: